# Patient Record
Sex: MALE | Race: WHITE
[De-identification: names, ages, dates, MRNs, and addresses within clinical notes are randomized per-mention and may not be internally consistent; named-entity substitution may affect disease eponyms.]

---

## 2019-08-09 ENCOUNTER — HOSPITAL ENCOUNTER (OUTPATIENT)
Dept: HOSPITAL 9 - MADLABBHPM | Age: 69
Discharge: HOME | End: 2019-08-09
Attending: FAMILY MEDICINE
Payer: MEDICARE

## 2019-08-09 DIAGNOSIS — M79.642: ICD-10-CM

## 2019-08-09 DIAGNOSIS — E78.00: ICD-10-CM

## 2019-08-09 DIAGNOSIS — M79.641: Primary | ICD-10-CM

## 2019-08-09 LAB
ALBUMIN SERPL BCG-MCNC: 4.5 G/DL (ref 3.4–4.8)
ALP SERPL-CCNC: 59 U/L (ref 40–150)
ALT SERPL W P-5'-P-CCNC: 22 U/L (ref 8–55)
ANION GAP SERPL CALC-SCNC: 13 MMOL/L (ref 10–20)
AST SERPL-CCNC: 20 U/L (ref 5–34)
BILIRUB SERPL-MCNC: 1.6 MG/DL (ref 0.2–1.2)
BUN SERPL-MCNC: 13 MG/DL (ref 8.4–25.7)
CALCIUM SERPL-MCNC: 9.6 MG/DL (ref 7.8–10.44)
CHD RISK SERPL-RTO: 4.6 (ref ?–4.5)
CHLORIDE SERPL-SCNC: 109 MMOL/L (ref 98–107)
CHOLEST SERPL-MCNC: 180 MG/DL
CO2 SERPL-SCNC: 23 MMOL/L (ref 23–31)
CREAT CL PREDICTED SERPL C-G-VRATE: 0 ML/MIN (ref 70–130)
GLOBULIN SER CALC-MCNC: 2.5 G/DL (ref 2.4–3.5)
GLUCOSE SERPL-MCNC: 99 MG/DL (ref 80–115)
HDLC SERPL-MCNC: 39 MG/DL
LDLC SERPL CALC-MCNC: 101 MG/DL
POTASSIUM SERPL-SCNC: 4.2 MMOL/L (ref 3.5–5.1)
SODIUM SERPL-SCNC: 141 MMOL/L (ref 136–145)
TRIGL SERPL-MCNC: 198 MG/DL (ref ?–150)

## 2019-08-09 PROCEDURE — 80061 LIPID PANEL: CPT

## 2019-08-09 PROCEDURE — 86200 CCP ANTIBODY: CPT

## 2019-08-09 PROCEDURE — 80053 COMPREHEN METABOLIC PANEL: CPT

## 2019-08-09 PROCEDURE — 36415 COLL VENOUS BLD VENIPUNCTURE: CPT

## 2019-08-09 PROCEDURE — 83520 IMMUNOASSAY QUANT NOS NONAB: CPT

## 2019-08-09 NOTE — RAD
3 VIEWS RIGHT HAND:

 

Date:  08/09/19 

 

COMPARISON:  

None. 

 

HISTORY:  

Right hand pain. 

 

FINDINGS:

3 views of the right hand show no evidence of acute fracture or dislocation. No degenerative changes 
are seen. No soft tissue swelling is present. 

 

IMPRESSION: 

Unremarkable exam. 

 

 

POS: [facility]

## 2019-08-09 NOTE — RAD
3 VIEWS LEFT HAND:

 

Date:  08/09/19 

 

COMPARISON:  

None. 

 

HISTORY:  

Left hand pain. 

 

FINDINGS:

3 views of the left hand show no evidence of acute fracture or dislocation. No degenerative changes a
re seen. No soft tissue swelling is present. 

 

IMPRESSION: 

Unremarkable exam. 

 

 

POS: [facility]

## 2019-08-12 LAB
CCP IGG SERPL-ACNC: (no result) ELIAU/ML
ELIA RAS NEW METHOD: (no result)
RA ELIA INTERPRETATION: (no result)
RHEUMATOID FACTOR IGA ANTIBODY: 3.9 IU/ML
RHEUMATOID FACTOR IGM ANTIBODY: (no result) IU/ML